# Patient Record
Sex: MALE | Race: OTHER | HISPANIC OR LATINO | URBAN - METROPOLITAN AREA
[De-identification: names, ages, dates, MRNs, and addresses within clinical notes are randomized per-mention and may not be internally consistent; named-entity substitution may affect disease eponyms.]

---

## 2021-09-02 ENCOUNTER — EMERGENCY (EMERGENCY)
Facility: HOSPITAL | Age: 31
LOS: 1 days | Discharge: HOME | End: 2021-09-02
Attending: EMERGENCY MEDICINE
Payer: MEDICAID

## 2021-09-02 VITALS
TEMPERATURE: 98 F | OXYGEN SATURATION: 97 % | RESPIRATION RATE: 17 BRPM | HEART RATE: 80 BPM | SYSTOLIC BLOOD PRESSURE: 164 MMHG | WEIGHT: 179.9 LBS | DIASTOLIC BLOOD PRESSURE: 100 MMHG

## 2021-09-02 LAB
ALBUMIN SERPL ELPH-MCNC: 4.8 G/DL — SIGNIFICANT CHANGE UP (ref 3.3–5.2)
ALP SERPL-CCNC: 70 U/L — SIGNIFICANT CHANGE UP (ref 40–120)
ALT FLD-CCNC: 16 U/L — SIGNIFICANT CHANGE UP
ANION GAP SERPL CALC-SCNC: 13 MMOL/L — SIGNIFICANT CHANGE UP (ref 5–17)
AST SERPL-CCNC: 17 U/L — SIGNIFICANT CHANGE UP
BASOPHILS # BLD AUTO: 0.01 K/UL — SIGNIFICANT CHANGE UP (ref 0–0.2)
BASOPHILS NFR BLD AUTO: 0.1 % — SIGNIFICANT CHANGE UP (ref 0–2)
BILIRUB SERPL-MCNC: 0.5 MG/DL — SIGNIFICANT CHANGE UP (ref 0.4–2)
BUN SERPL-MCNC: 12.6 MG/DL — SIGNIFICANT CHANGE UP (ref 8–20)
CALCIUM SERPL-MCNC: 10.1 MG/DL — SIGNIFICANT CHANGE UP (ref 8.6–10.2)
CHLORIDE SERPL-SCNC: 103 MMOL/L — SIGNIFICANT CHANGE UP (ref 98–107)
CO2 SERPL-SCNC: 25 MMOL/L — SIGNIFICANT CHANGE UP (ref 22–29)
CREAT SERPL-MCNC: 0.72 MG/DL — SIGNIFICANT CHANGE UP (ref 0.5–1.3)
EOSINOPHIL # BLD AUTO: 0 K/UL — SIGNIFICANT CHANGE UP (ref 0–0.5)
EOSINOPHIL NFR BLD AUTO: 0 % — SIGNIFICANT CHANGE UP (ref 0–6)
GLUCOSE SERPL-MCNC: 97 MG/DL — SIGNIFICANT CHANGE UP (ref 70–99)
HCT VFR BLD CALC: 46.7 % — SIGNIFICANT CHANGE UP (ref 39–50)
HGB BLD-MCNC: 15.4 G/DL — SIGNIFICANT CHANGE UP (ref 13–17)
IMM GRANULOCYTES NFR BLD AUTO: 0.5 % — SIGNIFICANT CHANGE UP (ref 0–1.5)
LYMPHOCYTES # BLD AUTO: 1.18 K/UL — SIGNIFICANT CHANGE UP (ref 1–3.3)
LYMPHOCYTES # BLD AUTO: 14 % — SIGNIFICANT CHANGE UP (ref 13–44)
MCHC RBC-ENTMCNC: 27.9 PG — SIGNIFICANT CHANGE UP (ref 27–34)
MCHC RBC-ENTMCNC: 33 GM/DL — SIGNIFICANT CHANGE UP (ref 32–36)
MCV RBC AUTO: 84.8 FL — SIGNIFICANT CHANGE UP (ref 80–100)
MONOCYTES # BLD AUTO: 0.46 K/UL — SIGNIFICANT CHANGE UP (ref 0–0.9)
MONOCYTES NFR BLD AUTO: 5.5 % — SIGNIFICANT CHANGE UP (ref 2–14)
NEUTROPHILS # BLD AUTO: 6.73 K/UL — SIGNIFICANT CHANGE UP (ref 1.8–7.4)
NEUTROPHILS NFR BLD AUTO: 79.9 % — HIGH (ref 43–77)
PLATELET # BLD AUTO: 263 K/UL — SIGNIFICANT CHANGE UP (ref 150–400)
POTASSIUM SERPL-MCNC: 3.8 MMOL/L — SIGNIFICANT CHANGE UP (ref 3.5–5.3)
POTASSIUM SERPL-SCNC: 3.8 MMOL/L — SIGNIFICANT CHANGE UP (ref 3.5–5.3)
PROT SERPL-MCNC: 7.8 G/DL — SIGNIFICANT CHANGE UP (ref 6.6–8.7)
RBC # BLD: 5.51 M/UL — SIGNIFICANT CHANGE UP (ref 4.2–5.8)
RBC # FLD: 14.1 % — SIGNIFICANT CHANGE UP (ref 10.3–14.5)
SARS-COV-2 RNA SPEC QL NAA+PROBE: SIGNIFICANT CHANGE UP
SODIUM SERPL-SCNC: 141 MMOL/L — SIGNIFICANT CHANGE UP (ref 135–145)
WBC # BLD: 8.42 K/UL — SIGNIFICANT CHANGE UP (ref 3.8–10.5)
WBC # FLD AUTO: 8.42 K/UL — SIGNIFICANT CHANGE UP (ref 3.8–10.5)

## 2021-09-02 PROCEDURE — 80053 COMPREHEN METABOLIC PANEL: CPT

## 2021-09-02 PROCEDURE — 36415 COLL VENOUS BLD VENIPUNCTURE: CPT

## 2021-09-02 PROCEDURE — U0005: CPT

## 2021-09-02 PROCEDURE — U0003: CPT

## 2021-09-02 PROCEDURE — 99284 EMERGENCY DEPT VISIT MOD MDM: CPT

## 2021-09-02 PROCEDURE — T1013: CPT

## 2021-09-02 PROCEDURE — 85025 COMPLETE CBC W/AUTO DIFF WBC: CPT

## 2021-09-02 PROCEDURE — 96374 THER/PROPH/DIAG INJ IV PUSH: CPT

## 2021-09-02 PROCEDURE — 99284 EMERGENCY DEPT VISIT MOD MDM: CPT | Mod: 25

## 2021-09-02 RX ORDER — LIDOCAINE 4 G/100G
15 CREAM TOPICAL ONCE
Refills: 0 | Status: COMPLETED | OUTPATIENT
Start: 2021-09-02 | End: 2021-09-02

## 2021-09-02 RX ORDER — GLUCAGON INJECTION, SOLUTION 0.5 MG/.1ML
0.5 INJECTION, SOLUTION SUBCUTANEOUS ONCE
Refills: 0 | Status: COMPLETED | OUTPATIENT
Start: 2021-09-02 | End: 2021-09-02

## 2021-09-02 RX ADMIN — GLUCAGON INJECTION, SOLUTION 0.5 MILLIGRAM(S): 0.5 INJECTION, SOLUTION SUBCUTANEOUS at 14:29

## 2021-09-02 RX ADMIN — LIDOCAINE 15 MILLILITER(S): 4 CREAM TOPICAL at 14:29

## 2021-09-02 RX ADMIN — Medication 30 MILLILITER(S): at 14:29

## 2021-09-02 NOTE — ED STATDOCS - OBJECTIVE STATEMENT
32 y/o male with no PMHx presents to ED c/o food bolus. Patient reports 2 days ago he might of eaten too fast, and 10 minutes after he started having chest pressure and a lot of saliva. Currently, patient reports he is unable to swallow food but is able to tolerate P.O. Patient reports the pressure is progressively worsening and was seen at urgent care earlier today and referred to ED for further evaluation. Patient reports he was told to try eating bananas in multiple different ways to possibly relieve his symptoms, with no relief.     Denies allergies, hx of abdominal surgeries  : Ji

## 2021-09-02 NOTE — ED STATDOCS - CARE PROVIDER_API CALL
Joanie Hilton (DO)  Gastroenterology  [address], [address]  [place] [address]  Phone: [phone]  Fax: [phone]  Follow Up Time:

## 2021-09-02 NOTE — ED STATDOCS - CLINICAL SUMMARY MEDICAL DECISION MAKING FREE TEXT BOX
Patient presenting with symptoms consistent with esophageal food bolus impaction, tolerating secretions but still symptomatic after 2 days. Will check labs, symptom control, and possible GI consult.

## 2021-09-02 NOTE — ED ADULT TRIAGE NOTE - CHIEF COMPLAINT QUOTE
Patient states that he feels like he has food stuck in this throat for the last 2 days. Airway intact, pt able to handle secretions.

## 2021-09-02 NOTE — ED STATDOCS - CARE PROVIDERS DIRECT ADDRESSES
,[email or web address]

## 2021-09-02 NOTE — ED STATDOCS - PATIENT PORTAL LINK FT
You can access the FollowMyHealth Patient Portal offered by [facility] by registering at the following website: [email or web address]. By joining [employer]’s FollowMyHealth portal, you will also be able to view your health information using other applications (apps) compatible with our system.

## 2021-09-02 NOTE — ED STATDOCS - PROGRESS NOTE DETAILS
PT evaluated by intake physician. HPI/PE/ROS as noted above. Will follow up plan per intake physician. Pt is tolerating crackers and soda in ED and states he feels much better. Will dc with GI f/u and strict return precautions.

## 2023-02-08 PROBLEM — Z00.00 ENCOUNTER FOR PREVENTIVE HEALTH EXAMINATION: Status: ACTIVE | Noted: 2023-02-08

## 2023-02-09 ENCOUNTER — APPOINTMENT (OUTPATIENT)
Dept: GASTROENTEROLOGY | Facility: CLINIC | Age: 33
End: 2023-02-09
Payer: MEDICAID

## 2023-02-09 VITALS
WEIGHT: 168 LBS | SYSTOLIC BLOOD PRESSURE: 128 MMHG | HEIGHT: 68 IN | TEMPERATURE: 97.5 F | DIASTOLIC BLOOD PRESSURE: 82 MMHG | BODY MASS INDEX: 25.46 KG/M2 | OXYGEN SATURATION: 100 % | RESPIRATION RATE: 16 BRPM | HEART RATE: 75 BPM

## 2023-02-09 DIAGNOSIS — Z78.9 OTHER SPECIFIED HEALTH STATUS: ICD-10-CM

## 2023-02-09 DIAGNOSIS — R10.13 EPIGASTRIC PAIN: ICD-10-CM

## 2023-02-09 DIAGNOSIS — Z86.39 PERSONAL HISTORY OF OTHER ENDOCRINE, NUTRITIONAL AND METABOLIC DISEASE: ICD-10-CM

## 2023-02-09 DIAGNOSIS — K21.9 GASTRO-ESOPHAGEAL REFLUX DISEASE W/OUT ESOPHAGITIS: ICD-10-CM

## 2023-02-09 PROCEDURE — 99204 OFFICE O/P NEW MOD 45 MIN: CPT

## 2023-02-09 RX ORDER — FAMOTIDINE 40 MG/1
40 TABLET, FILM COATED ORAL DAILY
Qty: 30 | Refills: 4 | Status: ACTIVE | COMMUNITY
Start: 2023-02-09 | End: 1900-01-01

## 2023-02-09 NOTE — PHYSICAL EXAM

## 2023-02-09 NOTE — REASON FOR VISIT
[Consultation] : a consultation visit [FreeTextEntry1] : heartburn, epigastric pain, acid regurgitation

## 2023-02-09 NOTE — ASSESSMENT
[FreeTextEntry1] : MARIYA PACE is a 32 year year old male with no significant PMH who presents today with complaints of epigastric pain, acid reflux and heartburn which occurs daily for the past 1 year. \par \par Lifestyle modifications discussed - decreased intake of acidic, citrus, spicy, greasy and fried foods, chocolate, caffeine, alcohol, and carbonated beverages, elevate head of bed at bedtime, avoid eating 2-3 hours prior to laying down/sleep, consider eating small, frequent meals. \par \par Start Famotidine 40 mg PO HS - symptoms are worse at night\par \par Endoscopy to be scheduled here to r/o gastritis, esophagitis, Lee's Esophagus, PUD or Celiac Disease. I have discussed the indications, risks and benefits of procedure with patient. Alternatives to endoscopy discussed with patient. All questions were answered. The patient agrees to proceed with endoscopy. Patient is medically optimized for endoscopy. Labs ordered.\par \par I, Dr. Joanie Hilton, was present during the history and physical.\par Abdominal exam–positive bowel sounds soft nontender\par I agree with the above assessment and plan\par

## 2023-02-09 NOTE — ADDENDUM
[FreeTextEntry1] : I, Shelley Newman NP, acted as scribe for VAZQUEZ Crane for this patient encounter.

## 2023-02-09 NOTE — HISTORY OF PRESENT ILLNESS
[FreeTextEntry1] : MARIYA PACE is a 32 year year old male with no significant PMH who presents today with complaints of epigastric pain, acid reflux and heartburn. Pt reports symptoms occur daily for the past 1 year. Associated symptoms includes acid regurgitation. Symptoms are worse at night and after eating spicy foods. Denies constipation, diarrhea, rectal bleeding, melena, black stools, unintentional weight loss, nausea, vomiting or dysphagia. He has not tried any OTC antacids for his symptoms. He has never had an endoscopy before.\par \par Denies any significant pulmonary or cardiac conditions.

## 2023-03-01 ENCOUNTER — NON-APPOINTMENT (OUTPATIENT)
Age: 33
End: 2023-03-01

## 2023-03-01 RX ORDER — OMEPRAZOLE 20 MG/1
20 CAPSULE, DELAYED RELEASE ORAL DAILY
Qty: 90 | Refills: 1 | Status: ACTIVE | COMMUNITY
Start: 2023-03-01 | End: 1900-01-01

## 2023-03-30 ENCOUNTER — APPOINTMENT (OUTPATIENT)
Dept: GASTROENTEROLOGY | Facility: GI CENTER | Age: 33
End: 2023-03-30

## 2023-04-07 ENCOUNTER — APPOINTMENT (OUTPATIENT)
Dept: ORTHOPEDIC SURGERY | Facility: CLINIC | Age: 33
End: 2023-04-07
Payer: MEDICAID

## 2023-04-07 VITALS
BODY MASS INDEX: 25.46 KG/M2 | SYSTOLIC BLOOD PRESSURE: 127 MMHG | HEIGHT: 68 IN | WEIGHT: 168 LBS | DIASTOLIC BLOOD PRESSURE: 87 MMHG | HEART RATE: 76 BPM

## 2023-04-07 DIAGNOSIS — M25.562 PAIN IN RIGHT KNEE: ICD-10-CM

## 2023-04-07 DIAGNOSIS — M25.561 PAIN IN RIGHT KNEE: ICD-10-CM

## 2023-04-07 PROCEDURE — 73564 X-RAY EXAM KNEE 4 OR MORE: CPT | Mod: 50

## 2023-04-07 PROCEDURE — 99203 OFFICE O/P NEW LOW 30 MIN: CPT

## 2023-04-07 NOTE — DISCUSSION/SUMMARY
[de-identified] : Right knee internal derangement, mild left knee pain\par \par Extensive discussion of the natural history of this issue was had with the patient.  We discussed the treatment options focusing on conservative therapy which includes anti-inflammatories, physical therapy/home exercise, & activity modification.  \par I would like an MRI of the right knee as patient has had this pain for years and is not resolved with physical therapy.\par Patient will return once the MRI is completed.

## 2023-04-07 NOTE — HISTORY OF PRESENT ILLNESS
[de-identified] : 4/7/2023–patient presents with bilateral knee pain right worse than left.  Is been going on for 3 years.  Denies inciting event.  States he has pain when walking and with deep flexion.  He does walk a lot for work.  Describes pain is diffuse in the knee.  Does have buckling sensations.  Tried physical therapy but that was of no help.  Has not had any other treatments.  Does not take anything for the pain.  Denies allergies, anticoagulation, tobacco use or past medical history.

## 2023-04-07 NOTE — PHYSICAL EXAM
[de-identified] : General Appearance: normal without acute distress\par Mental: Alert and oriented x 3\par Psych/affect: appropriate, cooperative\par Gait: Normal gait\par \par Right lower extremity\par Hip: Normal ROM without pain on IR/ER\par \par Knee\par Inspection: no effusion, no erythema.\par Wounds: None.\par Alignment: neutral.\par Palpation: Nontender palpation\par ROM active (in degrees): 0-140 pain with deep flexion\par Ligamentous laxity: stable to varus stress test, stable to valgus stress test\par Meniscal Test: Mildly positive McMurrays, negative Efren.\par Muscle Test: good quad strength. [de-identified] : 4/7/2023–bilateral knee xrays, standing AP/Lateral and Merchant films, and 45 degree PA standing view taken today in the office are reviewed and demonstrate preserved joint space, no abnormalities

## 2023-04-15 ENCOUNTER — RESULT REVIEW (OUTPATIENT)
Age: 33
End: 2023-04-15

## 2023-04-15 ENCOUNTER — OUTPATIENT (OUTPATIENT)
Dept: OUTPATIENT SERVICES | Facility: HOSPITAL | Age: 33
LOS: 1 days | End: 2023-04-15
Payer: COMMERCIAL

## 2023-04-15 ENCOUNTER — APPOINTMENT (OUTPATIENT)
Dept: MRI IMAGING | Facility: CLINIC | Age: 33
End: 2023-04-15

## 2023-04-15 DIAGNOSIS — M23.91 UNSPECIFIED INTERNAL DERANGEMENT OF RIGHT KNEE: ICD-10-CM

## 2023-04-15 PROCEDURE — 73721 MRI JNT OF LWR EXTRE W/O DYE: CPT | Mod: 26,RT

## 2023-04-15 PROCEDURE — 73721 MRI JNT OF LWR EXTRE W/O DYE: CPT

## 2023-04-21 ENCOUNTER — APPOINTMENT (OUTPATIENT)
Dept: ORTHOPEDIC SURGERY | Facility: CLINIC | Age: 33
End: 2023-04-21
Payer: MEDICAID

## 2023-04-21 PROCEDURE — 99214 OFFICE O/P EST MOD 30 MIN: CPT

## 2023-04-21 NOTE — HISTORY OF PRESENT ILLNESS
[de-identified] : 4/21/2023–patient presents for follow-up for bilateral knee pain right worse than left.  Pain is unchanged.  Here for MRI results.\par \par 4/7/2023–patient presents with bilateral knee pain right worse than left.  Is been going on for 3 years.  Denies inciting event.  States he has pain when walking and with deep flexion.  He does walk a lot for work.  Describes pain is diffuse in the knee.  Does have buckling sensations.  Tried physical therapy but that was of no help.  Has not had any other treatments.  Does not take anything for the pain.  Denies allergies, anticoagulation, tobacco use or past medical history.

## 2023-04-21 NOTE — DISCUSSION/SUMMARY
[de-identified] : Right more than left patella maltracking\par \par Extensive discussion of the natural history of this issue was had with the patient.  We discussed the treatment options focusing on conservative therapy which includes anti-inflammatories, physical therapy/home exercise, & activity modification.  A prescription for physical therapy was given to the patient.  We discussed trying a patella stabilizing brace to see if that helps.  She will return as needed.

## 2023-04-21 NOTE — PHYSICAL EXAM
[de-identified] : Right lower extremity\par Hip: Normal ROM without pain on IR/ER\par \par Knee\par Inspection: no effusion, no erythema.\par Wounds: None.\par Alignment: neutral.\par Palpation: Nontender palpation, negative patella grind test\par ROM active (in degrees): 0-140\par Ligamentous laxity: stable to varus stress test, stable to valgus stress test\par Meniscal Test: Mildly positive McMurrays, negative Efren.\par Muscle Test: good quad strength. [de-identified] : 4/21/2023–right knee MRI–small cartilage flap medial patella facet, mild patella maltracking, meniscus intact\par 4/7/2023–bilateral knee xrays, standing AP/Lateral and Merchant films, and 45 degree PA standing view taken today in the office are reviewed and demonstrate preserved joint space, no abnormalities

## 2023-04-26 ENCOUNTER — APPOINTMENT (OUTPATIENT)
Dept: ORTHOPEDIC SURGERY | Facility: CLINIC | Age: 33
End: 2023-04-26

## 2023-06-16 ENCOUNTER — APPOINTMENT (OUTPATIENT)
Dept: ORTHOPEDIC SURGERY | Facility: CLINIC | Age: 33
End: 2023-06-16
Payer: MEDICAID

## 2023-06-16 DIAGNOSIS — M23.91 UNSPECIFIED INTERNAL DERANGEMENT OF RIGHT KNEE: ICD-10-CM

## 2023-06-16 PROCEDURE — 99213 OFFICE O/P EST LOW 20 MIN: CPT

## 2023-06-16 NOTE — DISCUSSION/SUMMARY
[de-identified] : Right more than left patella maltracking\par \par Extensive discussion of the natural history of this issue was had with the patient.  We discussed the treatment options focusing on conservative therapy which includes anti-inflammatories, physical therapy/home exercise, & activity modification.  As physical therapy is not helping I recommend patient follow-up with my sports medicine partner.

## 2023-06-16 NOTE — PHYSICAL EXAM
[de-identified] : Right lower extremity\par Hip: Normal ROM without pain on IR/ER\par \par Knee\par Inspection: no effusion, no erythema.\par Wounds: None.\par Alignment: neutral.\par Palpation: Nontender palpation, negative patella grind test\par ROM active (in degrees): 0-140\par Ligamentous laxity: stable to varus stress test, stable to valgus stress test\par Meniscal Test: Negative McMurrays, negative Efren.\par Muscle Test: good quad strength. [de-identified] : 4/21/2023–right knee MRI–small cartilage flap medial patella facet, mild patella maltracking, meniscus intact\par 4/7/2023–bilateral knee xrays, standing AP/Lateral and Merchant films, and 45 degree PA standing view taken today in the office are reviewed and demonstrate preserved joint space, no abnormalities

## 2023-06-16 NOTE — HISTORY OF PRESENT ILLNESS
[de-identified] : 6/16/2023–patient for follow-up right knee pain.  Scribes it is deep on the inferior anterior aspect of the knee.  Physical therapy is not helping.\par \par 4/21/2023–patient presents for follow-up for bilateral knee pain right worse than left.  Pain is unchanged.  Here for MRI results.\par \par 4/7/2023–patient presents with bilateral knee pain right worse than left.  Is been going on for 3 years.  Denies inciting event.  States he has pain when walking and with deep flexion.  He does walk a lot for work.  Describes pain is diffuse in the knee.  Does have buckling sensations.  Tried physical therapy but that was of no help.  Has not had any other treatments.  Does not take anything for the pain.  Denies allergies, anticoagulation, tobacco use or past medical history.

## 2023-06-29 ENCOUNTER — APPOINTMENT (OUTPATIENT)
Dept: ORTHOPEDIC SURGERY | Facility: CLINIC | Age: 33
End: 2023-06-29
Payer: MEDICAID

## 2023-06-29 PROCEDURE — 99214 OFFICE O/P EST MOD 30 MIN: CPT

## 2023-06-29 PROCEDURE — 73560 X-RAY EXAM OF KNEE 1 OR 2: CPT | Mod: 26,RT

## 2023-06-29 PROCEDURE — 73521 X-RAY EXAM HIPS BI 2 VIEWS: CPT | Mod: 26

## 2023-06-29 NOTE — PHYSICAL EXAM
[de-identified] : General: Well appearing, no acute distress \par Neurologic: A&Ox3, No focal deficits \par Head: NCAT without abrasions, lacerations, or ecchymosis to head, face, or scalp \par Eyes: No scleral icterus, no gross abnormalities \par Respiratory: Equal chest wall expansion bilaterally, no accessory muscle use \par Lymphatic: No lymphadenopathy palpated \par Skin: Warm and dry \par Psychiatric: Normal mood and affect\par \par Examination of the bilateral knee reveals no significant effusion, erythema or ecchymosis.  There are no leg length discrepancies appreciated. Examination is noted to be bilaterally as follows. The patient's range of motion is from 0-125° limited by pain with crepitus through the motion.  The patient has pain with patella mobility and apprehension.  The patient displays no tenderness to palpation in the medial and lateral joint lines. There is tenderness in the in the medial and lateral patella facet.  The patient has a 4.5 out of 5 strength resisted straight leg raising as well as knee flexion and extension.  The knee is stable to Lachman testing, as well as anterior and posterior drawer.  There is no valgus or varus instability. The patient has no pain with Ange or Grind Testing.  The calf and thigh are soft, supple and nontender.  The patient is grossly neurovascularly intact distally. There is no pain with range of motion of the bilateral hips. No pain with logrolling of either hip.\par \par Examination of the Right hip reveals no obvious deformity or leg length discrepancy. There is no swelling noted. The patient is nontender to palpation over the greater trochanter, groin, and IT band. The patients range of motion is to 110 degrees of hip flexion, 40 degrees of abduction, 20 degrees of adduction, 15 degrees of internal rotation and 35 degrees of external rotation. The patient has 5/5 strength to resisted hip flexion, abduction and adduction. The patient has a negative GAY and positive FADIR Test.  Negative Zheng Test.  Negative resisted SLR test at 30 degrees of hip flexion ([facility]). Negative Piriformis Test. No SI joint instability. There is no pain with logrolling. The calf and thigh are soft and nontender bilaterally. The patient is grossly neurovascularly intact distally.\par \par Examination of the Left hip reveals no obvious deformity or leg length discrepancy. There is no swelling noted. The patient is nontender to palpation over the greater trochanter, groin, and IT band. The patients range of motion is to 110 degrees of hip flexion, 40 degrees of abduction, 20 degrees of adduction, 15 degrees of internal rotation and 35 degrees of external rotation. The patient has 5/5 strength to resisted hip flexion, abduction and adduction. The patient has a negative GAY and positive FADIR Test.  Negative Zheng Test.  Negative resisted SLR test at 30 degrees of hip flexion ([facility]). Negative Piriformis Test. No SI joint instability. There is no pain with logrolling. The calf and thigh are soft and nontender bilaterally. The patient is grossly neurovascularly intact distally.\par \par \par Bilateral patellofemoral pain exam macro [de-identified] : EXAM: [id number] - MR KNEE RT  - ORDERED BY: MEIR CEJA\par PROCEDURE DATE:  04/15/2023\par IMPRESSION:\par 1.  MRI findings of possible mild patellofemoral maltracking with borderline patella inderjit and mild edema in superolateral Hoffa's fat. Cartilage flap formation at the medial patellar facet.\par 2.  No meniscal tear.\par \par 3 views of pelvis were performed today and available for me to review. Results were discussed with the patient. They demonstrate no f/x, dislocation or other deformity.\par \par 2 views of L hip were performed today and available for me to review. Results were discussed with the patient. They demonstrate a large cam deformity of the hip consistent with VALARIE.\par \par 2 views of R hip were performed today and available for me to review. Results were discussed with the patient. They demonstrate a large cam deformity of the hip consistent with VALARIE.\par \par 1 skyline view of R knee were performed today and available for me to review. Results were discussed with the patient. They demonstrate no f/x, dislocation or other deformity.\par

## 2023-06-29 NOTE — HISTORY OF PRESENT ILLNESS
[Worsening] : worsening [___ yrs] : [unfilled] year(s) ago [2] : a current pain level of 2/10 [3] : an average pain level of 3/10 [1] : a minimum pain level of 1/10 [4] : a maximum pain level of 4/10 [Bending] : worsened by bending [Lifting] : worsened by lifting [Walking] : worsened by walking [de-identified] : MARIYA PACE is a 33 year male being seen for initial visit R knee pain. He was ref'd by Dr. Monique. He has been experiencing knee pain intermittently x 3 years. he complains of instability episodes. He works as a  and reports pain lifting, squatting and working for long periods of time. He recently saw Dr. Monique who ref'd him to physical therapy and ordered an MRI. MRI of his right knee revealed mild patellofemoral maltracking with borderline patella inderjit and mild edema in superolateral Hoffa's fat; cartilage flap formation at the medial patellar facet. He has been performing physical therapy and wearing a patellar stabilizing brace with no relief. Patient denies numbness and tingling to the extremities.  He notes some stiffness in his hips but no specific pain.\par \par

## 2023-06-29 NOTE — DISCUSSION/SUMMARY
[de-identified] : I had a lengthy discussion with the patient regarding their current condition. We discussed the treatment options including operative and nonoperative management. At this time I recommended he continue PT for both of his hips and both of his knees.  I am referring him for an MRI of his right hip.  He will follow-up with Dr. Butler after completion of his MRI.  All questions were answered

## 2023-07-10 ENCOUNTER — APPOINTMENT (OUTPATIENT)
Dept: ORTHOPEDIC SURGERY | Facility: CLINIC | Age: 33
End: 2023-07-10
Payer: MEDICAID

## 2023-07-10 VITALS — SYSTOLIC BLOOD PRESSURE: 129 MMHG | DIASTOLIC BLOOD PRESSURE: 84 MMHG | HEART RATE: 69 BPM

## 2023-07-10 DIAGNOSIS — M25.531 PAIN IN RIGHT WRIST: ICD-10-CM

## 2023-07-10 DIAGNOSIS — S63.511A SPRAIN OF CARPAL JOINT OF RIGHT WRIST, INITIAL ENCOUNTER: ICD-10-CM

## 2023-07-10 PROCEDURE — 73110 X-RAY EXAM OF WRIST: CPT | Mod: 50

## 2023-07-10 PROCEDURE — 99204 OFFICE O/P NEW MOD 45 MIN: CPT | Mod: 25

## 2023-07-10 PROCEDURE — 99214 OFFICE O/P EST MOD 30 MIN: CPT | Mod: 25

## 2023-07-10 PROCEDURE — 20605 DRAIN/INJ JOINT/BURSA W/O US: CPT | Mod: RT

## 2023-07-10 NOTE — PHYSICAL EXAM
[de-identified] :  Examination of the [right] wrist reveals tenderness at the level of the scapholunate ligament.  Freed shift test elicits discomfort. \par However, I do not feel a clunk.\par \par  [de-identified] : [4] views of [bilateral hands and wrists] were obtained today in my office and were seen by me and discussed with the patient. \par These are grossly unremarkable\par

## 2023-07-10 NOTE — HISTORY OF PRESENT ILLNESS
[FreeTextEntry1] : Joaquim is a pleasant 33-year-old male who presents today with a 1 to 2-year history of right wrist discomfort.  He states he works with his hands and has acquired this discomfort in an atraumatic fashion.  Lifting things has become somewhat uncomfortable for him

## 2023-07-10 NOTE — ASSESSMENT
[FreeTextEntry1] : ASSESSMENT:\par The patient comes in today with chronic exacerbated symptoms of right wrist pain for almost 2 years now.  This came about in an atraumatic fashion.  He describes heavy labor and difficulty with weightbearing.  At this point in time he declines a splint.  He does have tenderness at the level of the scapholunate likely a chronic sprain.  He has elected to proceed with an injection.  \par \par The patient was adequately and thoroughly informed of my assessment of their current condition(s).  - This may diminish bodily function for the extremity.\par We discussed prognosis, treatment modalities including operative and nonoperative options for the above diagnostic assessment.\par [For this I was able to review other physician’s note(s) including reviewing other tests, imaging results as well as personally view these results for my own interpretation.]\par \par Injection:\par \par The risks and benefits of a steroid injection were discussed in detail. The risks include but are not limited to: pain, infection, swelling, flare response, bleeding, subcutaneous fat atrophy, skin depigmentation and/or elevation of blood sugar. The risk of incomplete resolution of symptoms, recurrence and additional intervention was reviewed and considered by the patient. \par The patient agreed to proceed and under a sterile prep, I injected 1 unit into 2 cc of a combination of Celestone and Lidocaine into the right wrist joint.  The patient tolerated the injection well.\par \par The patient was adequately and thoroughly informed of my assessment of their current condition(s). \par \par DISCUSSION:\par 1.  I am hopeful that the injection for the right wrist will help relieve his intra-articular discomfort and scapholunate sprain discomfort.  He declines a splint\par 2.  We will see each other again in 6 months time\par

## 2023-07-11 ENCOUNTER — OUTPATIENT (OUTPATIENT)
Dept: OUTPATIENT SERVICES | Facility: HOSPITAL | Age: 33
LOS: 1 days | End: 2023-07-11

## 2023-07-11 ENCOUNTER — APPOINTMENT (OUTPATIENT)
Dept: MRI IMAGING | Facility: CLINIC | Age: 33
End: 2023-07-11
Payer: MEDICAID

## 2023-07-11 DIAGNOSIS — M25.851 OTHER SPECIFIED JOINT DISORDERS, RIGHT HIP: ICD-10-CM

## 2023-07-11 PROCEDURE — 73721 MRI JNT OF LWR EXTRE W/O DYE: CPT | Mod: 26,RT

## 2023-07-17 ENCOUNTER — APPOINTMENT (OUTPATIENT)
Dept: ORTHOPEDIC SURGERY | Facility: CLINIC | Age: 33
End: 2023-07-17
Payer: MEDICAID

## 2023-07-17 VITALS — WEIGHT: 168 LBS | HEIGHT: 68 IN | BODY MASS INDEX: 25.46 KG/M2

## 2023-07-17 DIAGNOSIS — M22.2X2 PATELLOFEMORAL DISORDERS, RIGHT KNEE: ICD-10-CM

## 2023-07-17 DIAGNOSIS — M25.852 OTHER SPECIFIED JOINT DISORDERS, LEFT HIP: ICD-10-CM

## 2023-07-17 DIAGNOSIS — M25.851 OTHER SPECIFIED JOINT DISORDERS, RIGHT HIP: ICD-10-CM

## 2023-07-17 DIAGNOSIS — M22.8X1 OTHER DISORDERS OF PATELLA, RIGHT KNEE: ICD-10-CM

## 2023-07-17 DIAGNOSIS — M22.2X1 PATELLOFEMORAL DISORDERS, RIGHT KNEE: ICD-10-CM

## 2023-07-17 PROCEDURE — 99214 OFFICE O/P EST MOD 30 MIN: CPT

## 2023-07-19 PROBLEM — M25.852 LEFT HIP IMPINGEMENT SYNDROME: Status: ACTIVE | Noted: 2023-06-29

## 2023-07-19 PROBLEM — M25.851 RIGHT HIP IMPINGEMENT SYNDROME: Status: ACTIVE | Noted: 2023-06-29

## 2023-07-19 PROBLEM — M22.2X1 PATELLOFEMORAL PAIN SYNDROME OF BOTH KNEES: Status: ACTIVE | Noted: 2023-06-29

## 2023-07-19 PROBLEM — M22.8X1 MALTRACKING OF RIGHT PATELLA: Status: ACTIVE | Noted: 2023-04-21

## 2023-07-19 NOTE — HISTORY OF PRESENT ILLNESS
[de-identified] : MARIYA is a 33 year male who presents today for an MRI review of his right hip. He notes R knee pain for 4 years. He denies JOAQUIM. He notes PT did not improve his pain. He denies wearing knee braces or receiving injections. When he experiences R knee pain, he experiences buckling and locking of his knee while walking

## 2023-07-19 NOTE — PHYSICAL EXAM
[de-identified] : General: Well appearing, no acute distress\par Neurologic: A&Ox3, No focal deficits\par Head: NCAT without abrasions, lacerations, or ecchymosis to head, face, or scalp\par Eyes: No scleral icterus, no gross abnormalities\par Respiratory: Equal chest wall expansion bilaterally, no accessory muscle use\par Lymphatic: No lymphadenopathy palpated\par Skin: Warm and dry\par Psychiatric: Normal mood and affect \par \par Right Knee: 	\par 	\par Flexion Active	135°	\par Flexion Passive	135°	\par Extension Active	 0-5°	\par Extension Passive	 0-5°	\par \par No weakness to flexion/extension. No evidence of instability in the AP plane or varus or valgus stress. Negative Lachman. Negative pivot shift. Negative anterior drawer test. Negative posterior drawer test. Negative Ange. Negative Apley grind. negative Ihsan test. Medial joint line tenderness, no lateral joint line tenderness. Tenderness over the patellar tendon. Patellofemoral crepitations without pain. No lateral tilting patella. No patellar apprehension. No crepitation in the medial and lateral femoral condyle. No proximal or distal swelling, edema or tenderness. Moderate effusion at the knee. No gross motor or sensory deficits. No intra-articular swelling. 2+ DP and PT pulses. No varus or valgus malalignment. Skin is intact. No rashes, scars or lesions. Quad strength normal. Motor exam 5/5 distally, EHL/FHL/GSC/TA R knee [de-identified] : MRI [facility]\par \par \par EXAM: [id number] - MR HIP RT - ORDERED BY: GERSON MALLOY\par \par \par PROCEDURE DATE: 07/11/2023\par \par IMPRESSION:\par \par 1. Increased signal intensity in the gluteus marian at its attachment at the greater trochanter consistent with muscle strain.\par 2. Mild gluteus marian tendinopathy versus partial tear.\par 3. Mild acetabular cartilage loss.\par

## 2023-07-19 NOTE — ADDENDUM
[FreeTextEntry1] : Documented by Shikha Crump  acting as a scribe for Dr. Butler on 07/17/2023 \par All medical record entries made by the Scribe were at my, Dr. Butler's, direction and personally dictated by me on 07/17/2023 . I have reviewed the chart and agree that the record accurately reflects my personal performance of the history, physical exam, procedure and imaging.

## 2023-07-19 NOTE — DISCUSSION/SUMMARY
[de-identified] : We had a thorough discussion regarding the nature of his pain, the pathophysiology, as well as all treatment options. Based on clinical exam, MRI and radiograph findings he  has patellar tendonitis. Patient was recommended to wear a patellar tendon strap Patient was given prescription of formal physical therapy that he will perform 2x/wk for 6-8 wks. If the pain persists, consider surgical intervention.  Patient will follow up in 6-8 wks for repeat clinical assessment.  All questions were answered and the patient verbalized understanding. The patient is in agreement with this treatment plan.

## 2025-02-17 ENCOUNTER — APPOINTMENT (OUTPATIENT)
Dept: GASTROENTEROLOGY | Facility: CLINIC | Age: 35
End: 2025-02-17

## 2025-03-13 ENCOUNTER — APPOINTMENT (OUTPATIENT)
Dept: GASTROENTEROLOGY | Facility: CLINIC | Age: 35
End: 2025-03-13